# Patient Record
Sex: FEMALE | Race: WHITE | NOT HISPANIC OR LATINO
[De-identification: names, ages, dates, MRNs, and addresses within clinical notes are randomized per-mention and may not be internally consistent; named-entity substitution may affect disease eponyms.]

---

## 2021-11-08 ENCOUNTER — APPOINTMENT (OUTPATIENT)
Dept: RADIOLOGY | Facility: CLINIC | Age: 26
End: 2021-11-08
Payer: MEDICAID

## 2021-11-08 PROCEDURE — 73120 X-RAY EXAM OF HAND: CPT | Mod: LT

## 2023-12-04 PROBLEM — Z00.00 ENCOUNTER FOR PREVENTIVE HEALTH EXAMINATION: Status: ACTIVE | Noted: 2023-12-04

## 2023-12-06 ENCOUNTER — APPOINTMENT (OUTPATIENT)
Dept: ULTRASOUND IMAGING | Facility: CLINIC | Age: 28
End: 2023-12-06
Payer: COMMERCIAL

## 2023-12-06 PROCEDURE — 76856 US EXAM PELVIC COMPLETE: CPT

## 2023-12-14 ENCOUNTER — APPOINTMENT (OUTPATIENT)
Dept: OBGYN | Facility: CLINIC | Age: 28
End: 2023-12-14
Payer: COMMERCIAL

## 2023-12-14 VITALS
BODY MASS INDEX: 19.46 KG/M2 | DIASTOLIC BLOOD PRESSURE: 75 MMHG | HEIGHT: 64 IN | SYSTOLIC BLOOD PRESSURE: 107 MMHG | WEIGHT: 114 LBS

## 2023-12-14 PROCEDURE — 99203 OFFICE O/P NEW LOW 30 MIN: CPT

## 2023-12-14 NOTE — DISCUSSION/SUMMARY
[FreeTextEntry1] : Discussion with pt regarding HPV Changes to cervix over time with HPV Colpo procedure itself Info regarding HPV vaccine series given in [place]  Explained to pt that she has a Bartholin cyst -currently not infected Discussed that if it causes pain than she may need it lanced otherwise no treatment needed Total time spent 30 minutes of which >50% was counseling

## 2023-12-14 NOTE — HISTORY OF PRESENT ILLNESS
[FreeTextEntry1] : 27 y/o seen here today post pap with HPV + non 16/18 and ASCUS pap She is sent for referral for colpo She is also concerned about a mass she has on her vagina that is big sometimes and other times it resolves  [place] speaking [id number] "Daniele"

## 2023-12-29 ENCOUNTER — APPOINTMENT (OUTPATIENT)
Dept: OBGYN | Facility: CLINIC | Age: 28
End: 2023-12-29
Payer: COMMERCIAL

## 2023-12-29 DIAGNOSIS — N75.0 CYST OF BARTHOLIN'S GLAND: ICD-10-CM

## 2023-12-29 DIAGNOSIS — R87.610 ATYPICAL SQUAMOUS CELLS OF UNDETERMINED SIGNIFICANCE ON CYTOLOGIC SMEAR OF CERVIX (ASC-US): ICD-10-CM

## 2023-12-29 DIAGNOSIS — R87.810 ATYPICAL SQUAMOUS CELLS OF UNDETERMINED SIGNIFICANCE ON CYTOLOGIC SMEAR OF CERVIX (ASC-US): ICD-10-CM

## 2023-12-29 PROCEDURE — 56440 MRSPLZATN BRTHLNS GLND CST: CPT

## 2023-12-29 PROCEDURE — 57456 ENDOCERV CURETTAGE W/SCOPE: CPT

## 2023-12-29 NOTE — PHYSICAL EXAM
[Chaperone Present] : A chaperone was present in the examining room during all aspects of the physical examination [Appropriately responsive] : appropriately responsive [Alert] : alert [No Acute Distress] : no acute distress [Oriented x3] : oriented x3 [FreeTextEntry1] : Right BG Cyst noted    Large "golf ball" size

## 2023-12-29 NOTE — PROCEDURE
[I & D] : I&D [Marsupialization] : marsupialization [Consent obtained] : Consent obtained [Left] : left [Bartholin's Cyst] : Bartholin's cyst [Size: ___cm] : Cyst is ~Vcm [____% Lidocaine w/Epi] : [unfilled]% Lidocaine with Epi [Fluid: ___mL] : Fluid: ~VmL [Purulent Fluid] : purulent fluid [No Complications] : There were no complications [Colposcopy] : Colposcopy  [Time out performed] : Pre-procedure time out performed.  Patient's name, date of birth and procedure confirmed. [Consent Obtained] : Consent obtained [Risks] : risks [Benefits] : benefits [Patient] : patient [Infection] : infection [Bleeding] : bleeding [ASCUS] : ASCUS [HPV High Risk] : HPV high risk [No Premedication] : no premedication [Colposcopy Adequate] : colposcopy adequate [Pap Performed] : pap not performed [SCI Fully Visualized] : SCI fully visualized [ECC Performed] : ECC performed [Lesion] : lesion seen [Biopsy] : biopsy taken [Hemostasis Obtained] : Hemostasis obtained [Tolerated Well] : the patient tolerated the procedure well [de-identified] : 2% lidocaine without epi  4 mL [de-identified] : scalpel used to make incision and cyst was drained    marsupialization performed [de-identified] : 1 [de-identified] : AWE noted 11-12 o'clock [de-identified] : 12 o'clock [de-identified] : ASCUS +HR HPV  ECCs and Cervical biopsy 12 o'clock follow up results  Left BG cyst noted   See procedure note

## 2023-12-29 NOTE — PLAN
[FreeTextEntry1] : colpo with ECCs and Cervical biopsy 12 o'clock  marsupialization of left BG cyst  sitz baths daily  follow up results and notify patient of any further intervention  ER warnings given

## 2023-12-29 NOTE — HISTORY OF PRESENT ILLNESS
[FreeTextEntry1] : ASCUS and HR HPV  patient here for colposcopy   patient counseled and consented patient also with left BG cyst which often becomes inflamed and enlarged.  agrees to I&D and Marsupilization.

## 2024-01-05 LAB — CORE LAB BIOPSY: NORMAL

## 2024-01-11 ENCOUNTER — APPOINTMENT (OUTPATIENT)
Dept: OBGYN | Facility: CLINIC | Age: 29
End: 2024-01-11